# Patient Record
Sex: MALE | Race: WHITE | NOT HISPANIC OR LATINO | Employment: FULL TIME | URBAN - NONMETROPOLITAN AREA
[De-identification: names, ages, dates, MRNs, and addresses within clinical notes are randomized per-mention and may not be internally consistent; named-entity substitution may affect disease eponyms.]

---
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
## 2024-01-23 NOTE — PROGRESS NOTES
Otolaryngology Consultation    Patient: Naresh Crisostomo  : 2005    Chief Complaint   Patient presents with    Epistaxis     Referred by LUZ Caro         HPI:  Naresh Crisostomo is a 18 year old male seen today for left sided epistaxis.     Patient was seen for concerns regarding epistaxis on 12/3/23 and started on Bactroban and Afrin.   INR normal range, CBC- plts normal and Hgb 12.7 on 12/3/23    Today, patient returns for concerns of epistaxis over the last 3 years but worsening epistaxis this past fall. He does reports bilaterally but left has been more pronounced. He does report bumping his nose, and causes quick brisk bleeding from.   Reports his last nosebleeds was about 4 days ago, but have been reduced compared to this past December.     Onset- Years, worsened since this fall.   Location- L>R  Duration- lasts for several minutes to twenty minutes  Able to control- Packing/ pressure.   Trials- He has been using afrin PRN.     No bleeding or clotting disorder.  No prior nasal trauma or injury.  No prior nasal or sinus surgery.   Reports no jaci concerns with blood pressure. Reports readings slightly high due to other issues today, but generally no concerns.       No current outpatient medications on file.       Allergies: Patient has no known allergies.     Past Medical History:   Diagnosis Date    H/O attention deficit hyperactivity disorder 2019    Hemangioma of skin and subcutaneous tissue     Removed from right neck    Single live birth     , birth weight 8# 7 oz.       Past Surgical History:   Procedure Laterality Date    OTHER SURGICAL HISTORY      [id number],OTHER,for penile scrotal hypospadias with cordae    OTHER SURGICAL HISTORY      [id number],OTHER       ENT family history reviewed    Social History     Tobacco Use    Smoking status: Never    Smokeless tobacco: Former     Types: Chew     Quit date: 2020    Tobacco comments:     Vaped -quit a year ago   Vaping Use    Vaping  "Use: Former    Substances: Nicotine    Devices: Disposable    Passive vaping exposure: Yes   Substance Use Topics    Alcohol use: Yes    Drug use: Not Currently     Types: Other     Comment: Drug use: No       Review of Systems  ROS: 10 point ROS neg other than the symptoms noted above in the HPI     Physical Exam    BP (!) 140/66 (BP Location: Right arm, Patient Position: Sitting, Cuff Size: Adult Regular)   Pulse 78   Temp (!) 96.3  F (35.7  C) (Tympanic)   Resp 18   Ht 1.956 m (6' 5\")   Wt 88.1 kg (194 lb 3.2 oz)   SpO2 100%   BMI 23.03 kg/m      General - The patient is well nourished and well developed, and appears to have good nutritional status.  Alert and oriented to person and place, answers questions and cooperates with examination appropriately.   Head and Face - Normocephalic and atraumatic, with no gross asymmetry noted.  The facial nerve is intact, with strong symmetric movements.  Voice and Breathing - The patient was breathing comfortably without the use of accessory muscles. There was no wheezing, stridor, or stertor.  The patients voice was clear and strong, and had appropriate pitch and quality.  Ears -The external auditory canals are patent, the tympanic membranes are intact without effusion, retraction or mass.  Bony landmarks are intact.  Eyes - Extraocular movements intact, and the pupils were reactive to light.  Sclera were not icteric or injected, conjunctiva were pink and moist.  Mouth - Examination of the oral cavity showed pink, healthy oral mucosa. No lesions or ulcerations noted.  The tongue was mobile and midline, and the dentition were in good condition.    Throat - The walls of the oropharynx were smooth, pink, moist, symmetric, and had no lesions or ulcerations.  The tonsillar pillars and soft palate were symmetric.  The uvula was midline on elevation.    Neck - Normal midline excursion of the laryngotracheal complex during swallowing.  Full range of motion on passive " movement.  Palpation of the occipital, submental, submandibular, internal jugular chain, and supraclavicular nodes did not demonstrate any abnormal lymph nodes or masses.  Palpation of the thyroid was soft and smooth, with no nodules or goiter appreciated.  The trachea was mobile and midline.  Nose - External contour is symmetric, no gross deflection or scars.  Nasal mucosa is pink and moist with no abnormal mucus.  The septum and turbinates were evaluated: Prominent vessels bilaterally, L>R Kiesselbach plexus .  No polyps, masses, or purulence noted on examination.    To evaluate the nose due to epistaxis, I performed rigid nasal endoscopy. The nose was anesthetized with home afrin or topical lidocaine and neosynephrine in the office.    I began with the LEFT side using a 0 degree rigid nasal endoscope, and then similarly examined the RIGHT side    Findings:  Septum: Midline. L>R Kiesselbach plexus prominent vessels. No active bleeding.   Inferior turbinates:  Normal, 1+   Middle turbinate and middle meatus:  No purulence, no polyposis, no synechiae    Mucosa is  healthy throughout without polyps nor polypoid degeneration  Superior meatus is clear  Frontal recess clear  Sphenoethmoidal clear  Nasopharynx is clear  No posterior source viz'd.     The patient tolerated procedure well    Nasal Cautery - Options were explained to the patient regarding conservative measures versus nasal cautery in the clinic today.  The patient wished to proceed with cautery.  I applied silver nitrate to the vessels, starting distally, and working my way back to the vessels  point of entry onto the nasal mucosa. There was a brisk bleed from base which was cauterized.  After completion, I placed a full piece of Gelfoam over the area that was cauterized.  The patient tolerated the procedure well.        Impression and Plan- Nareshhans Crisostomo is a 18 year old male with:    ICD-10-CM    1. Epistaxis  R04.0 Adult ENT  Referral      sodium chloride (OCEAN) 0.65 % nasal spray     saline nasal (AYR SALINE) GEL topical gel     lidocaine 2%-oxymetazoline 0.025% nasal solution 2 spray     NASAL ENDOSCOPY, DIAGNOSTIC     NASAL ENDOSCOPY, DIAGNOSTIC          Start nasal saline 2 sprays to each nostril 3-4 times daily  Start Nasal Ayr gel as directed  Use Afrin 2 sprays to each nostril 2 times daily for 2 days then hold.   Follow up in 2-4 weeks for recheck.     The patient has been cauterized today for epistaxis.  I counseled them on keeping the head above the level of the heart at all times for the next week.  No picking or rubbing at the cauterized side of the nose, or blowing for 1 week.  The patient was counseled that in the event of another recurrence of bleeding, to contact office. May require local w/ suction cautery if recurrent epistaxis. This was discussed with Naresh today.    Ibis Kelly PA-C  ENT  [facility]    Answers submitted by the patient for this visit:  General Questionnaire (Submitted on 1/24/2024)  Chief Complaint: Chronic problems general questions HPI Form  What is the reason for your visit today? : nosebleeds  How many servings of fruits and vegetables do you eat daily?: 2-3  On average, how many sweetened beverages do you drink each day (Examples: soda, juice, sweet tea, etc.  Do NOT count diet or artificially sweetened beverages)?: 1  How many minutes a day do you exercise enough to make your heart beat faster?: 60 or more  How many days a week do you exercise enough to make your heart beat faster?: 6  How many days per week do you miss taking your medication?: 0

## 2024-01-24 ENCOUNTER — OFFICE VISIT (OUTPATIENT)
Dept: FAMILY MEDICINE | Facility: OTHER | Age: 19
End: 2024-01-24
Attending: STUDENT IN AN ORGANIZED HEALTH CARE EDUCATION/TRAINING PROGRAM
Payer: COMMERCIAL

## 2024-01-24 ENCOUNTER — TELEPHONE (OUTPATIENT)
Dept: FAMILY MEDICINE | Facility: OTHER | Age: 19
End: 2024-01-24

## 2024-01-24 VITALS
RESPIRATION RATE: 18 BRPM | DIASTOLIC BLOOD PRESSURE: 66 MMHG | SYSTOLIC BLOOD PRESSURE: 140 MMHG | HEART RATE: 78 BPM | WEIGHT: 194.2 LBS | BODY MASS INDEX: 22.93 KG/M2 | HEIGHT: 77 IN | TEMPERATURE: 96.3 F | OXYGEN SATURATION: 100 %

## 2024-01-24 DIAGNOSIS — R04.0 EPISTAXIS: ICD-10-CM

## 2024-01-24 DIAGNOSIS — R82.71 BACTERIA IN URINE: Primary | ICD-10-CM

## 2024-01-24 PROCEDURE — 99213 OFFICE O/P EST LOW 20 MIN: CPT | Mod: 25 | Performed by: PHYSICIAN ASSISTANT

## 2024-01-24 PROCEDURE — 250N000009 HC RX 250: Performed by: PHYSICIAN ASSISTANT

## 2024-01-24 PROCEDURE — 30901 CONTROL OF NOSEBLEED: CPT | Performed by: PHYSICIAN ASSISTANT

## 2024-01-24 PROCEDURE — G0463 HOSPITAL OUTPT CLINIC VISIT: HCPCS | Mod: 25

## 2024-01-24 RX ORDER — DEXTROAMPHETAMINE SACCHARATE, AMPHETAMINE ASPARTATE, DEXTROAMPHETAMINE SULFATE AND AMPHETAMINE SULFATE 5; 5; 5; 5 MG/1; MG/1; MG/1; MG/1
20 TABLET ORAL DAILY
COMMUNITY
End: 2024-02-12

## 2024-01-24 RX ORDER — ECHINACEA PURPUREA EXTRACT 125 MG
TABLET ORAL
Qty: 480 ML | Refills: 4 | Status: SHIPPED | OUTPATIENT
Start: 2024-01-24 | End: 2024-06-20

## 2024-01-24 RX ORDER — SODIUM CHLORIDE/ALOE VERA
GEL (GRAM) NASAL 4 TIMES DAILY PRN
Qty: 14.1 G | Refills: 3 | Status: SHIPPED | OUTPATIENT
Start: 2024-01-24 | End: 2024-06-20

## 2024-01-24 RX ADMIN — Medication 2 SPRAY: at 08:21

## 2024-01-24 ASSESSMENT — PAIN SCALES - GENERAL: PAINLEVEL: NO PAIN (0)

## 2024-01-24 NOTE — PATIENT INSTRUCTIONS
Start nasal saline 2 sprays to each nostril 3-4 times daily  Start Nasal Ayr gel as directed  Use Afrin 2 sprays to each nostril 2 times daily for 2 days then hold.   Follow up in 2-4 weeks for recheck.       Thank you for allowing LUZ Blevins and our ENT team to participate in your care.  If your medications are too expensive, please give the nurse a call.  We can possibly change this medication.  If you have a scheduling or an appointment question please contact our Health Unit Coordinator at their direct line [phone].   ALL nursing questions or concerns can be directed to your ENT nurse, Sherita at: [phone].      Epistaxis (nose bleed) Instructions    With heavy bleeding, start irrigating with very warm Kalin Med saline irrigation or any other warm saline.  Repeatedly irrigate with warm saline until heavy bleeding stops or improves.     Next gently blow nose and repeat irrigation.  Once the bleeding has slowed down, clamp nose for 5 minutes    Next apply Afrin (oxymetazoline) spray.  2 sprays to affected nostril.  Repeat after 5 minutes.    Next reapply clamp for 30 minutes.    Gargle and spit with warm saline to remove any clots or blood from your throat.    Lie down with head elevated to 45 degrees if possible for at least 15-30 minutes.    Continue to use Afrin nasal spray, 2 sprays twice a day to the nose for 4 days then stop.  Do not use Afrin long term.    Apply Ayr gel, bacitracin or bactroban antibiotic ointment to the nose twice a day and before bed.  Continue this for 4 days then start daily moisture instructions below.    If your bleeding cannot be controlled go to the closest Emergency room.    You may continue to have some occasional oozing from the nose but the goal is to control the heavy bleeding.    You may also try over the counter NasalCEASE which is a safe packing that you can insert yourself at home.    Daily Nasal Moisture Instructions    Keep your nose moist  Use ocean nasal spray,  Ayr nasal saline or any other over the counter nasal saline at least 4-5 times a day for 2 weeks.    Use Ayr gel twice a day and at bedtime for 2 weeks.    If no further bleeding cut back to twice a day saline use and twice a day ayr gel use    If you have any severe allergies take a daily antihistamine (claritin, allergra, zyrtec or similar)    No heavy lifting over 10 pounds, no bending or straining for as long as possible.  Preferably for 2 weeks following a heavy bleed.    Stop any medications that may cause bleeding.  Contact your primary care physician with questions about medications and let them know if you stop a medication.

## 2024-01-24 NOTE — TELEPHONE ENCOUNTER
Reason for call: Request for a referral.    Referral requested for what concern?  Reoccurring UTI's     Have you already been seen by the specialty you need the referral for?  No has not been seen by Urology    If yes, Date:   10/18/2023,  Location:   [facility],  Provider:   EVELIO    Additional comments:       Preferred method for responding to this message: Telephone Call    Phone number patient can be reached at? Cell number on file:    Telephone Information:   Mobile [phone]       If we can't reach you directly, may we leave a detailed response at the number you provided? Yes    Stacey Eisenberg on 1/24/2024 at 9:05 AM

## 2024-02-12 ENCOUNTER — OFFICE VISIT (OUTPATIENT)
Dept: FAMILY MEDICINE | Facility: OTHER | Age: 19
End: 2024-02-12
Attending: FAMILY MEDICINE
Payer: COMMERCIAL

## 2024-02-12 VITALS
SYSTOLIC BLOOD PRESSURE: 110 MMHG | RESPIRATION RATE: 20 BRPM | TEMPERATURE: 98.5 F | DIASTOLIC BLOOD PRESSURE: 62 MMHG | BODY MASS INDEX: 23.29 KG/M2 | OXYGEN SATURATION: 98 % | HEART RATE: 98 BPM | WEIGHT: 196.4 LBS

## 2024-02-12 DIAGNOSIS — Z86.59 H/O ATTENTION DEFICIT HYPERACTIVITY DISORDER: Primary | ICD-10-CM

## 2024-02-12 PROCEDURE — G0463 HOSPITAL OUTPT CLINIC VISIT: HCPCS | Performed by: FAMILY MEDICINE

## 2024-02-12 PROCEDURE — 99213 OFFICE O/P EST LOW 20 MIN: CPT | Performed by: FAMILY MEDICINE

## 2024-02-12 RX ORDER — DEXTROAMPHETAMINE SACCHARATE, AMPHETAMINE ASPARTATE, DEXTROAMPHETAMINE SULFATE AND AMPHETAMINE SULFATE 5; 5; 5; 5 MG/1; MG/1; MG/1; MG/1
20 TABLET ORAL DAILY
Qty: 30 TABLET | Refills: 0 | Status: SHIPPED | OUTPATIENT
Start: 2024-02-12 | End: 2024-03-13

## 2024-02-12 ASSESSMENT — PAIN SCALES - GENERAL: PAINLEVEL: NO PAIN (0)

## 2024-02-12 NOTE — NURSING NOTE
Patient here for medication refill. Medication Reconciliation: complete.    Gayla Sinclair LPN  2/12/2024 4:16 PM

## 2024-02-13 NOTE — PROGRESS NOTES
SUBJECTIVE:   Naresh Crisostomo is a 18 year old male who presents to clinic today for the following health issues: ADHD follow-up    Patient arrives here for medication follow-up.  He is on Adderall and tolerating it well.  Last dose was today.  Currently does not offer any complaints or concerns.    History of Present Illness       Reason for visit:  Medications    He eats 2-3 servings of fruits and vegetables daily.He consumes 1 sweetened beverage(s) daily.He exercises with enough effort to increase his heart rate 60 or more minutes per day.  He exercises with enough effort to increase his heart rate 7 days per week.   He is taking medications regularly.        Patient Active Problem List    Diagnosis Date Noted    Distal penile hypospadias status post surgery 2023     Priority: Medium    Tinea versicolor 2022     Priority: Medium    Tinnitus, bilateral 2019     Priority: Medium    H/O attention deficit hyperactivity disorder 2019     Priority: Medium    Allergic conjunctivitis and rhinitis 2014     Priority: Medium     Past Medical History:   Diagnosis Date    H/O attention deficit hyperactivity disorder 2019    Hemangioma of skin and subcutaneous tissue     Removed from right neck    Single live birth     , birth weight 8# 7 oz.      Past Surgical History:   Procedure Laterality Date    OTHER SURGICAL HISTORY      [id number],OTHER,for penile scrotal hypospadias with cordae    OTHER SURGICAL HISTORY      [id number],OTHER       Review of Systems     OBJECTIVE:     /62   Pulse 98   Temp 98.5  F (36.9  C)   Resp 20   Wt 89.1 kg (196 lb 6.4 oz)   SpO2 98%   BMI 23.29 kg/m    Body mass index is 23.29 kg/m .  Physical Exam  Constitutional:       Appearance: Normal appearance.   Neurological:      Mental Status: He is alert.   Psychiatric:         Mood and Affect: Mood normal.         Thought Content: Thought content normal.             ASSESSMENT/PLAN:         (Z86.59) H/O  attention deficit hyperactivity disorder  (primary encounter diagnosis)  Comment:  reviewed appears appropriate  Plan: amphetamine-dextroamphetamine (ADDERALL) 20 MG         tablet, amphetamine-dextroamphetamine         (ADDERALL) 20 MG tablet,         amphetamine-dextroamphetamine (ADDERALL) 20 MG         tablet  Medication refill for 3 months      Bertrand Rolle MD  [facility]

## 2024-02-27 ENCOUNTER — MYC MEDICAL ADVICE (OUTPATIENT)
Dept: FAMILY MEDICINE | Facility: OTHER | Age: 19
End: 2024-02-27
Payer: COMMERCIAL

## 2024-02-27 DIAGNOSIS — F90.0 ADHD (ATTENTION DEFICIT HYPERACTIVITY DISORDER), INATTENTIVE TYPE: Primary | ICD-10-CM

## 2024-02-27 NOTE — TELEPHONE ENCOUNTER
Michael'd up 2 months of Adderall XR 20mg with fill date to coorespond with the 2 future orders currently in place.              Sent to PCP for review/signing.     Patient update on [facility].    Anabel Dougherty RN on 2/27/2024 at 3:41 PM

## 2024-02-28 RX ORDER — DEXTROAMPHETAMINE SACCHARATE, AMPHETAMINE ASPARTATE MONOHYDRATE, DEXTROAMPHETAMINE SULFATE AND AMPHETAMINE SULFATE 5; 5; 5; 5 MG/1; MG/1; MG/1; MG/1
20 CAPSULE, EXTENDED RELEASE ORAL DAILY
Qty: 30 CAPSULE | Refills: 0 | Status: SHIPPED | OUTPATIENT
Start: 2024-02-28 | End: 2024-06-20

## 2024-02-28 RX ORDER — DEXTROAMPHETAMINE SACCHARATE, AMPHETAMINE ASPARTATE MONOHYDRATE, DEXTROAMPHETAMINE SULFATE AND AMPHETAMINE SULFATE 5; 5; 5; 5 MG/1; MG/1; MG/1; MG/1
20 CAPSULE, EXTENDED RELEASE ORAL DAILY
Qty: 30 CAPSULE | Refills: 0 | Status: SHIPPED | OUTPATIENT
Start: 2024-02-28

## 2024-04-23 DIAGNOSIS — Z87.440 PERSONAL HISTORY OF URINARY TRACT INFECTION: Primary | ICD-10-CM

## 2024-05-10 ENCOUNTER — OFFICE VISIT (OUTPATIENT)
Dept: UROLOGY | Facility: OTHER | Age: 19
End: 2024-05-10
Attending: FAMILY MEDICINE
Payer: COMMERCIAL

## 2024-05-10 ENCOUNTER — LAB (OUTPATIENT)
Dept: LAB | Facility: OTHER | Age: 19
End: 2024-05-10
Attending: UROLOGY
Payer: COMMERCIAL

## 2024-05-10 VITALS
HEIGHT: 77 IN | HEART RATE: 93 BPM | RESPIRATION RATE: 20 BRPM | WEIGHT: 196 LBS | BODY MASS INDEX: 23.14 KG/M2 | OXYGEN SATURATION: 99 %

## 2024-05-10 DIAGNOSIS — Z87.710 HISTORY OF HYPOSPADIAS: ICD-10-CM

## 2024-05-10 DIAGNOSIS — N30.01 ACUTE CYSTITIS WITH HEMATURIA: ICD-10-CM

## 2024-05-10 DIAGNOSIS — R31.0 GROSS HEMATURIA: Primary | ICD-10-CM

## 2024-05-10 DIAGNOSIS — Z87.440 PERSONAL HISTORY OF URINARY TRACT INFECTION: ICD-10-CM

## 2024-05-10 LAB
ALBUMIN UR-MCNC: NEGATIVE MG/DL
APPEARANCE UR: CLEAR
BILIRUB UR QL STRIP: NEGATIVE
COLOR UR AUTO: ABNORMAL
GLUCOSE UR STRIP-MCNC: NEGATIVE MG/DL
HGB UR QL STRIP: NEGATIVE
KETONES UR STRIP-MCNC: NEGATIVE MG/DL
LEUKOCYTE ESTERASE UR QL STRIP: NEGATIVE
NITRATE UR QL: POSITIVE
PH UR STRIP: 7 [PH] (ref 5–9)
SP GR UR STRIP: 1.01 (ref 1–1.03)
UROBILINOGEN UR STRIP-MCNC: NORMAL MG/DL

## 2024-05-10 PROCEDURE — 51798 US URINE CAPACITY MEASURE: CPT | Performed by: UROLOGY

## 2024-05-10 PROCEDURE — 87186 SC STD MICRODIL/AGAR DIL: CPT | Mod: ZL | Performed by: UROLOGY

## 2024-05-10 PROCEDURE — 87086 URINE CULTURE/COLONY COUNT: CPT | Mod: ZL | Performed by: UROLOGY

## 2024-05-10 PROCEDURE — G0463 HOSPITAL OUTPT CLINIC VISIT: HCPCS | Mod: 25

## 2024-05-10 PROCEDURE — 99202 OFFICE O/P NEW SF 15 MIN: CPT | Performed by: UROLOGY

## 2024-05-10 PROCEDURE — 81003 URINALYSIS AUTO W/O SCOPE: CPT | Mod: ZL

## 2024-05-10 NOTE — PROGRESS NOTES
Chief Complaint: Consult (History of UTI)  .    HPI: Mr. Naresh Crisostomo is a 18 year old year old male with a history of hypospadias repair who presents today May 10, 2024 for evaluation of acute cystitis.    Seen last 2023 where he had reported malodorous urine.  No significant urgency or frequency but some occasional burning.    Culture was done growing a strain of E. coli for which she was treated.    Since that time he has occasional burning but again no urgency or frequency.  Denies nocturia.  Stream is been strong.  No reported fever or chills    Has had gross hematuria several months ago which was fleeting and improved with fluid consumption.  Denies a history of STDs and has been tested within the last 6 months.    Currently monogamous with 1 child.    Past Medical History:   Diagnosis Date    H/O attention deficit hyperactivity disorder 2019    Hemangioma of skin and subcutaneous tissue     Removed from right neck    Single live birth     , birth weight 8# 7 oz.       Past Surgical History:   Procedure Laterality Date    OTHER SURGICAL HISTORY      [id number],OTHER,for penile scrotal hypospadias with cordae    OTHER SURGICAL HISTORY      [id number],OTHER       FAMILY HISTORY: Denies a family history of state or testicular cancer.      SOCIAL HISTORY:    reports that he has never smoked. His smokeless tobacco use includes chew.    Current Outpatient Medications   Medication Sig Dispense Refill    amphetamine-dextroamphetamine (ADDERALL XR) 20 MG 24 hr capsule Take 1 capsule (20 mg) by mouth daily 30 capsule 0    amphetamine-dextroamphetamine (ADDERALL XR) 20 MG 24 hr capsule Take 1 capsule (20 mg) by mouth daily 30 capsule 0    saline nasal (AYR SALINE) GEL topical gel Apply into each nare 4 times daily as needed for congestion 14.1 g 3    sodium chloride (OCEAN) 0.65 % nasal spray 2 sprays to each nostril 4 times daily 480 mL 4       ALLERGIES: Patient has no known allergies.     GENERAL PHYSICAL  "EXAM:   Vitals: Pulse 93   Resp 20   Ht 1.956 m (6' 5\")   Wt 88.9 kg (196 lb)   SpO2 99%   BMI 23.24 kg/m    Body mass index is 23.24 kg/m .    GENERAL: Well groomed, well developed, well nourished male in NAD.  Very tall  HEENT:  Normal   CV:  Warm extremities   RESPIRATORY: Normal respiratory effort.    GI: Soft, NT, ND,  MS: Moving 4  NEURO: Alert and oriented x 3.  PSYCH: Normal mood and affect, pleasant and agreeable during interview and exam.    : Penis circumcised, meatus surgically placed in the normal anatomic position.  Suture tracts on the ventral glans with surgical scars on the ventral distal penis.  No evidence of fistula    Normal testes without mass, no hernia     Prostate: 15-20 smooth benign        PVR: Residual urine by ultrasound was 2 ml.           RADIOLOGY: The following tests were reviewed: None    LABS: The last test results for Ms. Naresh Crisostomo were reviewed.   Results for orders placed or performed in visit on 05/10/24 (from the past 24 hour(s))   Urinalysis Macroscopic   Result Value Ref Range    Color Urine Light Yellow Colorless, Straw, Light Yellow, Yellow    Appearance Urine Clear Clear    Glucose Urine Negative Negative mg/dL    Bilirubin Urine Negative Negative    Ketones Urine Negative Negative mg/dL    Specific Gravity Urine 1.010 1.000 - 1.030    Blood Urine Negative Negative    pH Urine 7.0 5.0 - 9.0    Protein Albumin Urine Negative Negative mg/dL    Urobilinogen Urine Normal Normal, 2.0 mg/dL    Nitrite Urine Positive (A) Negative    Leukocyte Esterase Urine Negative Negative       PSA - No results found for: \"PSA\"  BMP -   Recent Labs   Lab Test 07/11/23  0819      POTASSIUM 4.2   CHLORIDE 103   CO2 26   BUN 13.8   CR 0.97   GLC 80   MONICA 9.5       CBC -   Recent Labs   Lab Test 12/03/23  1206   WBC 5.8   HGB 12.7*          ASSESSMENT:   Acute cystitis with history of gross hematuria  History of hypospadias repair    PLAN:   Definitely some concern with " this young man with his episode of acute cystitis which would be very unusual given his age.  In fact today's urine does look suspicious and is nitrate positive.  It will be cultured.    Given his history of hypospadias repair I am concerned about possible abnormalities in his urethra urinary tract including stricture disease of the urethra or even a urethral diverticulum.    I do recommend upper tract evaluation with renal ultrasound given his infection and gross hematuria.  We will check a KUB to eliminate obvious stone disease.    Cystoscopy will be attempted although we may end up performing a retrograde urethrogram if we are unable to cannulate his urethra which looks to be of normal caliber at this time.    Risks of bleeding infection and stricture disease were discussed.    Ultimately if he does have a significant abnormality then referral to the [facility] for treatment would be recommended.    All questions were addressed.    18 minutes spent on the date of this encounter doing chart review, history and exam, documentation and further activities as noted above.      Jonathan Mason MD   [facility]

## 2024-05-10 NOTE — NURSING NOTE
"Chief Complaint   Patient presents with    Consult     History of UTI       Initial Pulse 93   Resp 20   Ht 1.956 m (6' 5\")   Wt 88.9 kg (196 lb)   SpO2 99%   BMI 23.24 kg/m   Estimated body mass index is 23.24 kg/m  as calculated from the following:    Height as of this encounter: 1.956 m (6' 5\").    Weight as of this encounter: 88.9 kg (196 lb).  Medication Reconciliation: complete   AUA= 0  post void residual 2 ml    Elisabet Morris LPN    "

## 2024-05-12 LAB — BACTERIA UR CULT: ABNORMAL

## 2024-05-13 DIAGNOSIS — N30.00 ACUTE CYSTITIS WITHOUT HEMATURIA: Primary | ICD-10-CM

## 2024-05-13 NOTE — RESULT ENCOUNTER NOTE
Naresh, left you a message.  Urine culture was +.  We will treat this.  I sent Rx to [facility].  In addition I want to order the xray where they inject contrast into your urethra looking for diverticulum of the urethra.  I ordered that and my office will reach out to you.  Tried to call this morning.  Left you a message.  Dr. Mason

## 2024-05-28 ENCOUNTER — TELEPHONE (OUTPATIENT)
Dept: FAMILY MEDICINE | Facility: OTHER | Age: 19
End: 2024-05-28
Payer: COMMERCIAL

## 2024-05-28 ENCOUNTER — MYC REFILL (OUTPATIENT)
Dept: FAMILY MEDICINE | Facility: OTHER | Age: 19
End: 2024-05-28
Payer: COMMERCIAL

## 2024-05-28 DIAGNOSIS — F90.0 ADHD (ATTENTION DEFICIT HYPERACTIVITY DISORDER), INATTENTIVE TYPE: ICD-10-CM

## 2024-05-28 NOTE — TELEPHONE ENCOUNTER
Patient is out of his meds (didn't not specify which ones exactly) and needs refills      Mk Romero on 5/28/2024 at 10:55 AM

## 2024-05-28 NOTE — TELEPHONE ENCOUNTER
Patient took last Adderal this morning he would like a refill. I did transfer to the appointment line to schedule medication follow up.Gayla Sinclair LPN .......................5/28/2024  12:05 PM

## 2024-05-29 NOTE — TELEPHONE ENCOUNTER
Patient informed he needs to be seen prior to a refill being sent. He states he has an appointment scheduled but not until 6/20/24. He was informed he may call at 7 am daily to check for cancellations.     Eulalia Burkett CMA on 5/29/2024 at 8:43 AM

## 2024-05-30 RX ORDER — DEXTROAMPHETAMINE SACCHARATE, AMPHETAMINE ASPARTATE MONOHYDRATE, DEXTROAMPHETAMINE SULFATE AND AMPHETAMINE SULFATE 5; 5; 5; 5 MG/1; MG/1; MG/1; MG/1
20 CAPSULE, EXTENDED RELEASE ORAL DAILY
Qty: 30 CAPSULE | Refills: 0 | OUTPATIENT
Start: 2024-05-30

## 2024-05-30 NOTE — TELEPHONE ENCOUNTER
[facility] sent Rx request for the following:      Requested Prescriptions   Pending Prescriptions Disp Refills    amphetamine-dextroamphetamine (ADDERALL XR) 20 MG 24 hr capsule 30 capsule 0     Sig: Take 1 capsule (20 mg) by mouth daily       There is no refill protocol information for this order          Last Prescription Date:   2/28/24  Last Fill Qty/Refills:         30, R-0    Last Office Visit:              2/12/24   Future Office visit:             Next 5 appointments (look out 90 days)      Jun 20, 2024  8:00 AM  (Arrive by 7:45 AM)  Provider Visit with Bertrand Rolle MD  [facility] [address]  [place] [id number] [phone]           Anabel Jones RN on 5/30/2024 at 1:27 PM

## 2024-06-07 ENCOUNTER — HOSPITAL ENCOUNTER (OUTPATIENT)
Dept: GENERAL RADIOLOGY | Facility: OTHER | Age: 19
Discharge: HOME | End: 2024-06-07
Attending: UROLOGY
Payer: COMMERCIAL

## 2024-06-07 ENCOUNTER — HOSPITAL ENCOUNTER (OUTPATIENT)
Dept: ULTRASOUND IMAGING | Facility: OTHER | Age: 19
Discharge: HOME | End: 2024-06-07
Attending: UROLOGY
Payer: COMMERCIAL

## 2024-06-07 DIAGNOSIS — R31.0 GROSS HEMATURIA: ICD-10-CM

## 2024-06-07 DIAGNOSIS — N30.01 ACUTE CYSTITIS WITH HEMATURIA: ICD-10-CM

## 2024-06-07 PROCEDURE — 76770 US EXAM ABDO BACK WALL COMP: CPT

## 2024-06-07 PROCEDURE — 74018 RADEX ABDOMEN 1 VIEW: CPT

## 2024-06-11 ENCOUNTER — HOSPITAL ENCOUNTER (OUTPATIENT)
Dept: GENERAL RADIOLOGY | Facility: OTHER | Age: 19
Discharge: HOME | End: 2024-06-11
Attending: UROLOGY | Admitting: UROLOGY
Payer: COMMERCIAL

## 2024-06-11 ENCOUNTER — MYC MEDICAL ADVICE (OUTPATIENT)
Dept: FAMILY MEDICINE | Facility: OTHER | Age: 19
End: 2024-06-11
Payer: COMMERCIAL

## 2024-06-11 DIAGNOSIS — Q54.1 DISTAL PENILE HYPOSPADIAS: Primary | ICD-10-CM

## 2024-06-11 DIAGNOSIS — N30.00 ACUTE CYSTITIS WITHOUT HEMATURIA: ICD-10-CM

## 2024-06-11 PROCEDURE — 250N000009 HC RX 250: Performed by: UROLOGY

## 2024-06-11 PROCEDURE — 74450 X-RAY URETHRA/BLADDER: CPT

## 2024-06-11 RX ORDER — LIDOCAINE HYDROCHLORIDE 20 MG/ML
JELLY TOPICAL ONCE
Status: COMPLETED | OUTPATIENT
Start: 2024-06-11 | End: 2024-06-11

## 2024-06-11 RX ADMIN — LIDOCAINE HYDROCHLORIDE: 20 JELLY TOPICAL at 14:55

## 2024-06-20 ENCOUNTER — OFFICE VISIT (OUTPATIENT)
Dept: FAMILY MEDICINE | Facility: OTHER | Age: 19
End: 2024-06-20
Attending: FAMILY MEDICINE
Payer: COMMERCIAL

## 2024-06-20 VITALS
OXYGEN SATURATION: 99 % | HEART RATE: 100 BPM | RESPIRATION RATE: 18 BRPM | WEIGHT: 192.2 LBS | TEMPERATURE: 97.9 F | SYSTOLIC BLOOD PRESSURE: 120 MMHG | DIASTOLIC BLOOD PRESSURE: 74 MMHG | BODY MASS INDEX: 22.79 KG/M2

## 2024-06-20 DIAGNOSIS — F90.0 ADHD (ATTENTION DEFICIT HYPERACTIVITY DISORDER), INATTENTIVE TYPE: ICD-10-CM

## 2024-06-20 DIAGNOSIS — Z86.59 H/O ATTENTION DEFICIT HYPERACTIVITY DISORDER: Primary | ICD-10-CM

## 2024-06-20 LAB
AMPHETAMINES UR QL SCN: NORMAL
BARBITURATES UR QL SCN: NORMAL
BENZODIAZ UR QL SCN: NORMAL
BZE UR QL SCN: NORMAL
CANNABINOIDS UR QL SCN: NORMAL
FENTANYL UR QL: NORMAL
OPIATES UR QL SCN: NORMAL
PCP QUAL URINE (ROCHE): NORMAL

## 2024-06-20 PROCEDURE — 80307 DRUG TEST PRSMV CHEM ANLYZR: CPT | Mod: ZL | Performed by: FAMILY MEDICINE

## 2024-06-20 PROCEDURE — G0463 HOSPITAL OUTPT CLINIC VISIT: HCPCS

## 2024-06-20 PROCEDURE — 99214 OFFICE O/P EST MOD 30 MIN: CPT | Performed by: FAMILY MEDICINE

## 2024-06-20 RX ORDER — DEXTROAMPHETAMINE SACCHARATE, AMPHETAMINE ASPARTATE MONOHYDRATE, DEXTROAMPHETAMINE SULFATE AND AMPHETAMINE SULFATE 5; 5; 5; 5 MG/1; MG/1; MG/1; MG/1
20 CAPSULE, EXTENDED RELEASE ORAL DAILY
Qty: 30 CAPSULE | Refills: 0 | Status: SHIPPED | OUTPATIENT
Start: 2024-06-20

## 2024-06-20 ASSESSMENT — PAIN SCALES - GENERAL: PAINLEVEL: NO PAIN (0)

## 2024-06-20 NOTE — NURSING NOTE
Patient here for Adderall refill. Medication Reconciliation: complete.    Gayla Sinclair LPN  6/20/2024 8:06 AM

## 2024-06-20 NOTE — LETTER

## 2024-07-25 ENCOUNTER — MYC MEDICAL ADVICE (OUTPATIENT)
Dept: FAMILY MEDICINE | Facility: OTHER | Age: 19
End: 2024-07-25
Payer: COMMERCIAL

## 2024-07-26 NOTE — TELEPHONE ENCOUNTER
June 20th July 19th August 18th    [facility]  Adderall Scripts from 6/20 OV with Aquilino.     Patient update on MyChart.    Anabel Dougherty RN on 7/26/2024 at 8:09 AM

## 2024-07-30 DIAGNOSIS — N30.00 ACUTE CYSTITIS WITHOUT HEMATURIA: Primary | ICD-10-CM

## 2025-01-12 ENCOUNTER — HEALTH MAINTENANCE LETTER (OUTPATIENT)
Age: 20
End: 2025-01-12

## 2025-02-25 ENCOUNTER — OFFICE VISIT (OUTPATIENT)
Dept: FAMILY MEDICINE | Facility: OTHER | Age: 20
End: 2025-02-25
Attending: FAMILY MEDICINE
Payer: COMMERCIAL

## 2025-02-25 VITALS
WEIGHT: 208.2 LBS | BODY MASS INDEX: 24.69 KG/M2 | TEMPERATURE: 98 F | HEART RATE: 90 BPM | RESPIRATION RATE: 20 BRPM | OXYGEN SATURATION: 98 % | DIASTOLIC BLOOD PRESSURE: 62 MMHG | SYSTOLIC BLOOD PRESSURE: 108 MMHG

## 2025-02-25 DIAGNOSIS — Z86.59 H/O ATTENTION DEFICIT HYPERACTIVITY DISORDER: Primary | ICD-10-CM

## 2025-02-25 DIAGNOSIS — F90.0 ADHD (ATTENTION DEFICIT HYPERACTIVITY DISORDER), INATTENTIVE TYPE: ICD-10-CM

## 2025-02-25 RX ORDER — DEXTROAMPHETAMINE SACCHARATE, AMPHETAMINE ASPARTATE MONOHYDRATE, DEXTROAMPHETAMINE SULFATE AND AMPHETAMINE SULFATE 5; 5; 5; 5 MG/1; MG/1; MG/1; MG/1
20 CAPSULE, EXTENDED RELEASE ORAL DAILY
Qty: 30 CAPSULE | Refills: 0 | Status: SHIPPED | OUTPATIENT
Start: 2025-02-25

## 2025-02-25 ASSESSMENT — PAIN SCALES - GENERAL: PAINLEVEL_OUTOF10: NO PAIN (0)

## 2025-02-25 NOTE — NURSING NOTE
Patient here for adderall refill. Medication Reconciliation: complete.    Gayla Sinclair LPN  2/25/2025 7:58 AM

## 2025-02-25 NOTE — PROGRESS NOTES
SUBJECTIVE:   Naresh Crisostomo is a 19 year old male who presents to clinic today for the following health issues: Adderall refill patient arrives here for Adderall refill.    History of Present Illness       Reason for visit:  Meds He is missing 5 dose(s) of medications per week.  Review of the chart shows his last physical was 7/31/2019.  I really did review his immunizations also.  He declines immunizations at this time.  Patient is up-to-date on his drug screens contract.         Review of Systems     OBJECTIVE:     /62   Pulse 90   Temp 98  F (36.7  C)   Resp 20   Wt 94.4 kg (208 lb 3.2 oz)   SpO2 98%   BMI 24.69 kg/m    Body mass index is 24.69 kg/m .  Physical Exam        ASSESSMENT/PLAN:         (Z86.59) H/O attention deficit hyperactivity disorder  (primary encounter diagnosis)  Comment: Medications refilled.  Plan:     (F90.0) ADHD (attention deficit hyperactivity disorder), inattentive type  Comment:   Plan: amphetamine-dextroamphetamine (ADDERALL XR) 20         MG 24 hr capsule, amphetamine-dextroamphetamine        (ADDERALL XR) 20 MG 24 hr capsule,         amphetamine-dextroamphetamine (ADDERALL XR) 20         MG 24 hr capsule        Recommend update immunizations.  Including hepatitis A.  Patient does report he spent some time potentially in  type environments.  Patient will wait until next visit  The longitudinal plan of care for the diagnosis(es)/condition(s) as documented were addressed during this visit. Due to the added complexity in care, I will continue to support Naresh in the subsequent management and with ongoing continuity of care.      Bertrand Rolle MD  [facility]

## 2025-06-09 ENCOUNTER — OFFICE VISIT (OUTPATIENT)
Dept: FAMILY MEDICINE | Facility: OTHER | Age: 20
End: 2025-06-09
Attending: FAMILY MEDICINE
Payer: COMMERCIAL

## 2025-06-09 VITALS
WEIGHT: 199.2 LBS | HEART RATE: 72 BPM | RESPIRATION RATE: 16 BRPM | BODY MASS INDEX: 23.62 KG/M2 | SYSTOLIC BLOOD PRESSURE: 138 MMHG | TEMPERATURE: 97.4 F | OXYGEN SATURATION: 99 % | DIASTOLIC BLOOD PRESSURE: 80 MMHG

## 2025-06-09 DIAGNOSIS — F90.0 ADHD (ATTENTION DEFICIT HYPERACTIVITY DISORDER), INATTENTIVE TYPE: ICD-10-CM

## 2025-06-09 LAB — CREAT UR-MCNC: 200 MG/DL

## 2025-06-09 PROCEDURE — G2211 COMPLEX E/M VISIT ADD ON: HCPCS | Performed by: FAMILY MEDICINE

## 2025-06-09 PROCEDURE — 90471 IMMUNIZATION ADMIN: CPT | Performed by: FAMILY MEDICINE

## 2025-06-09 PROCEDURE — 3075F SYST BP GE 130 - 139MM HG: CPT | Performed by: FAMILY MEDICINE

## 2025-06-09 PROCEDURE — 1126F AMNT PAIN NOTED NONE PRSNT: CPT | Performed by: FAMILY MEDICINE

## 2025-06-09 PROCEDURE — 3079F DIAST BP 80-89 MM HG: CPT | Performed by: FAMILY MEDICINE

## 2025-06-09 PROCEDURE — 99213 OFFICE O/P EST LOW 20 MIN: CPT | Mod: 25 | Performed by: FAMILY MEDICINE

## 2025-06-09 PROCEDURE — 80324 DRUG SCREEN AMPHETAMINES 1/2: CPT | Mod: ZL | Performed by: FAMILY MEDICINE

## 2025-06-09 PROCEDURE — 90651 9VHPV VACCINE 2/3 DOSE IM: CPT | Performed by: FAMILY MEDICINE

## 2025-06-09 RX ORDER — DEXTROAMPHETAMINE SACCHARATE, AMPHETAMINE ASPARTATE MONOHYDRATE, DEXTROAMPHETAMINE SULFATE AND AMPHETAMINE SULFATE 5; 5; 5; 5 MG/1; MG/1; MG/1; MG/1
20 CAPSULE, EXTENDED RELEASE ORAL DAILY
Qty: 30 CAPSULE | Refills: 0 | Status: SHIPPED | OUTPATIENT
Start: 2025-06-09

## 2025-06-09 ASSESSMENT — PAIN SCALES - GENERAL: PAINLEVEL_OUTOF10: NO PAIN (0)

## 2025-06-09 NOTE — LETTER

## 2025-06-09 NOTE — NURSING NOTE
"Chief Complaint   Patient presents with    Recheck Medication   Patient presents to the clinic today to recheck medications    Initial There were no vitals taken for this visit. Estimated body mass index is 24.69 kg/m  as calculated from the following:    Height as of 5/10/24: 1.956 m (6' 5\").    Weight as of 2/25/25: 94.4 kg (208 lb 3.2 oz).  Meds Reconciled: complete      Yulissa Livingston LPN,LPN on 6/9/2025 at 7:52 AM  Ext. [phone]        Yulissa Livingston LPN  "

## 2025-06-09 NOTE — PROGRESS NOTES
Assessment & Plan     ADHD (attention deficit hyperactivity disorder), inattentive type  Medication refill.   reviewed.  - amphetamine-dextroamphetamine (ADDERALL XR) 20 MG 24 hr capsule; Take 1 capsule (20 mg) by mouth daily. Refill on or after 06/25/ 2025  - amphetamine-dextroamphetamine (ADDERALL XR) 20 MG 24 hr capsule; Take 1 capsule (20 mg) by mouth daily. Refill on or after 07/25/ 2024  - amphetamine-dextroamphetamine (ADDERALL XR) 20 MG 24 hr capsule; Take 1 capsule (20 mg) by mouth daily. Refill on or after 08/25/ 2025  - Drug Confirmation Panel Urine with Creat; Future  - Drug Confirmation Panel Urine with Creat            Follow-up in 3 months    Subjective   Naresh is a 19 year old, presenting for the following health issues:  Recheck Medication      6/9/2025     7:52 AM   Additional Questions   Roomed by Yulissa MUÑOZ     History of Present Illness       Reason for visit:  Meds   He is taking medications regularly.            Patient arrives here for medication renewal.  He is doing well.  Currently has no complaints or concerns.  Review of the chart shows that he is in need of a contract which was updated.  Also drug screen          Objective    There were no vitals taken for this visit.  There is no height or weight on file to calculate BMI.  Physical Exam   GENERAL: alert and no distress  MS: no gross musculoskeletal defects noted, no edema    Drug screen pending.  Medication refill      The longitudinal plan of care for the diagnosis(es)/condition(s) as documented were addressed during this visit. Due to the added complexity in care, I will continue to support Naresh in the subsequent management and with ongoing continuity of care.    Signed Electronically by: Bertrand Rolle MD

## 2025-06-11 LAB
AMPHET UR CFM-MCNC: 910 NG/ML
AMPHET/CREAT UR: 455 NG/MG {CREAT}

## (undated) RX ORDER — LIDOCAINE HYDROCHLORIDE 20 MG/ML
JELLY TOPICAL
Status: DISPENSED
Start: 2024-06-11